# Patient Record
Sex: FEMALE | URBAN - METROPOLITAN AREA
[De-identification: names, ages, dates, MRNs, and addresses within clinical notes are randomized per-mention and may not be internally consistent; named-entity substitution may affect disease eponyms.]

---

## 2018-10-23 ENCOUNTER — APPOINTMENT (RX ONLY)
Dept: URBAN - METROPOLITAN AREA CLINIC 152 | Facility: CLINIC | Age: 53
Setting detail: DERMATOLOGY
End: 2018-10-23

## 2018-10-23 DIAGNOSIS — L71.8 OTHER ROSACEA: ICD-10-CM

## 2018-10-23 DIAGNOSIS — L90.5 SCAR CONDITIONS AND FIBROSIS OF SKIN: ICD-10-CM

## 2018-10-23 DIAGNOSIS — L85.3 XEROSIS CUTIS: ICD-10-CM

## 2018-10-23 PROCEDURE — ? COUNSELING

## 2018-10-23 PROCEDURE — ? INTRALESIONAL KENALOG

## 2018-10-23 PROCEDURE — ? PRESCRIPTION

## 2018-10-23 PROCEDURE — 11900 INJECT SKIN LESIONS </W 7: CPT

## 2018-10-23 PROCEDURE — ? TREATMENT REGIMEN

## 2018-10-23 PROCEDURE — 99213 OFFICE O/P EST LOW 20 MIN: CPT | Mod: 25

## 2018-10-23 RX ORDER — IVERMECTIN 10 MG/G
CREAM TOPICAL QHS
Qty: 1 | Refills: 2 | Status: ERX | COMMUNITY
Start: 2018-10-23

## 2018-10-23 RX ADMIN — IVERMECTIN: 10 CREAM TOPICAL at 00:00

## 2018-10-23 ASSESSMENT — LOCATION DETAILED DESCRIPTION DERM
LOCATION DETAILED: LEFT CHIN
LOCATION DETAILED: UPPER STERNUM
LOCATION DETAILED: MIDDLE STERNUM
LOCATION DETAILED: LEFT MEDIAL MALAR CHEEK
LOCATION DETAILED: SUB-XIPHOID

## 2018-10-23 ASSESSMENT — LOCATION SIMPLE DESCRIPTION DERM
LOCATION SIMPLE: CHEST
LOCATION SIMPLE: CHIN
LOCATION SIMPLE: LEFT CHEEK
LOCATION SIMPLE: ABDOMEN

## 2018-10-23 ASSESSMENT — LOCATION ZONE DERM
LOCATION ZONE: TRUNK
LOCATION ZONE: FACE

## 2018-10-23 NOTE — PROCEDURE: INTRALESIONAL KENALOG
Include Z78.9 (Other Specified Conditions Influencing Health Status) As An Associated Diagnosis?: No
Concentration Of Solution Injected (Mg/Ml): 20.0
Kenalog Preparation: Kenalog
Detail Level: Detailed
Total Volume Injected (Ccs- Only Use Numbers And Decimals): 0.2
Medical Necessity Clause: This procedure was medically necessary because the lesions that were treated were:
Consent: The risks of atrophy were reviewed with the patient.
X Size Of Lesion In Cm (Optional): 0

## 2018-10-23 NOTE — HPI: SKIN LESIONS
How Severe Is Your Skin Lesion?: mild
Have Your Skin Lesions Been Treated?: not been treated
Is This A New Presentation, Or A Follow-Up?: Skin Lesions
Additional History: Pt would like scar on chest looked at\\nPt notes irritation and redness on face she wants addressed

## 2018-10-23 NOTE — PROCEDURE: COUNSELING
Patient Specific Counseling (Will Not Stick From Patient To Patient): -Pt notes improvement, notes two areas that itches a little\\n-Reassured that we can treat the irritated areas with ILK\\n-Let pt know they can treat the discoloring with a laser, pt states she is not bothered by appearance
Detail Level: Detailed
Patient Specific Counseling (Will Not Stick From Patient To Patient): -Discussed most likely diagnosis \\n-Explained different types of rosacea and treatment options\\n-Recommended a topical, Soolantra, since condition is mild enough, for three months\\n-Recommended  Cetaphil as moisturizer to avoid dryness
Patient Specific Counseling (Will Not Stick From Patient To Patient): -Discussed dry skin may be associated with pt’s rosacea \\n-Pt notes irritation from CPAP machine, specifically where it straps on\\n-Recommended a thick moisturizer, given coupons for Cetaphil

## 2018-10-23 NOTE — PROCEDURE: TREATMENT REGIMEN
Action 1: Start
Action 4: Continue
Detail Level: Zone
Treatment 1: Soolantra
Sig For Treatment 1 (If Needed): once daily at bedtime

## 2018-10-30 ENCOUNTER — RX ONLY (OUTPATIENT)
Age: 53
Setting detail: RX ONLY
End: 2018-10-30

## 2018-10-30 RX ORDER — IVERMECTIN 10 MG/G
CREAM TOPICAL
Qty: 1 | Refills: 2 | Status: ERX

## 2019-08-21 ENCOUNTER — APPOINTMENT (RX ONLY)
Dept: URBAN - METROPOLITAN AREA CLINIC 151 | Facility: CLINIC | Age: 54
Setting detail: DERMATOLOGY
End: 2019-08-21

## 2019-08-21 DIAGNOSIS — L30.9 DERMATITIS, UNSPECIFIED: ICD-10-CM

## 2019-08-21 DIAGNOSIS — L71.8 OTHER ROSACEA: ICD-10-CM

## 2019-08-21 DIAGNOSIS — L90.5 SCAR CONDITIONS AND FIBROSIS OF SKIN: ICD-10-CM

## 2019-08-21 PROBLEM — E13.9 OTHER SPECIFIED DIABETES MELLITUS WITHOUT COMPLICATIONS: Status: ACTIVE | Noted: 2019-08-21

## 2019-08-21 PROBLEM — I10 ESSENTIAL (PRIMARY) HYPERTENSION: Status: ACTIVE | Noted: 2019-08-21

## 2019-08-21 PROBLEM — M12.9 ARTHROPATHY, UNSPECIFIED: Status: ACTIVE | Noted: 2019-08-21

## 2019-08-21 PROCEDURE — ? PRESCRIPTION MEDICATION MANAGEMENT

## 2019-08-21 PROCEDURE — 11104 PUNCH BX SKIN SINGLE LESION: CPT

## 2019-08-21 PROCEDURE — ? PRESCRIPTION

## 2019-08-21 PROCEDURE — ? DIAGNOSIS COMMENT

## 2019-08-21 PROCEDURE — ? BIOPSY BY PUNCH METHOD

## 2019-08-21 PROCEDURE — 99213 OFFICE O/P EST LOW 20 MIN: CPT | Mod: 25

## 2019-08-21 RX ORDER — CLOBETASOL PROPIONATE 0.5 MG/G
CREAM TOPICAL BID
Qty: 1 | Refills: 3 | Status: ERX | COMMUNITY
Start: 2019-08-21

## 2019-08-21 RX ADMIN — CLOBETASOL PROPIONATE: 0.5 CREAM TOPICAL at 00:00

## 2019-08-21 ASSESSMENT — LOCATION DETAILED DESCRIPTION DERM: LOCATION DETAILED: LEFT MEDIAL TRAPEZIAL NECK

## 2019-08-21 ASSESSMENT — LOCATION SIMPLE DESCRIPTION DERM: LOCATION SIMPLE: POSTERIOR NECK

## 2019-08-21 ASSESSMENT — LOCATION ZONE DERM: LOCATION ZONE: NECK

## 2019-08-21 NOTE — PROCEDURE: MIPS QUALITY
Quality 226: Preventive Care And Screening: Tobacco Use: Screening And Cessation Intervention: Patient screened for tobacco use and is an ex/non-smoker
Quality 131: Pain Assessment And Follow-Up: Pain assessment using a standardized tool is documented as negative, no follow-up plan required
Detail Level: Detailed
Quality 130: Documentation Of Current Medications In The Medical Record: Current Medications Documented
Quality 110: Preventive Care And Screening: Influenza Immunization: Influenza Immunization Administered during Influenza season
Quality 431: Preventive Care And Screening: Unhealthy Alcohol Use - Screening: Patient screened for unhealthy alcohol use using a single question and scores less than 2 times per year

## 2019-08-21 NOTE — PROCEDURE: DIAGNOSIS COMMENT
Comment: s/p CABG. Patient is very pleased with the ultimate result of the previous treatment.
Detail Level: Simple
Comment: Discussed possible diagnoses. Explained how biopsy would allow us to come to a more definitive dx and would allow for more focused treatment.

## 2019-08-21 NOTE — HPI: RASH
Is This A New Presentation, Or A Follow-Up?: Rash
Additional History: BRIDGETT Marie at [facility], was concerned that it may be autoimmune response.

## 2019-08-21 NOTE — PROCEDURE: BIOPSY BY PUNCH METHOD
Hemostasis: None
Render Path Notes In Note?: No
Was A Bandage Applied: Yes
Lab: -[id number]
Biopsy Type: H and E
Dressing: pressure dressing
Post-Care Instructions: I reviewed with the patient in detail post-care instructions. Patient is to keep the biopsy site dry overnight, and then apply Vaseline or Aquaphor daily until healed. Patient may apply hydrogen peroxide soaks to remove any crusting.
Lab Facility: [id number]
Additional Anesthesia Volume In Cc (Will Not Render If 0): 0
Anesthesia Type: 1% lidocaine with epinephrine
Body Location Override (Optional - Billing Will Still Be Based On Selected Body Map Location If Applicable): left posterior neck
Home Suture Removal Text: Patient was provided a home suture removal kit and will remove their sutures at home.  If they have any questions or difficulties they will call the office.
Path Notes (To The Dermatopathologist): Please call if there is a delay of >5 days rendering dx.
Detail Level: Detailed
Consent: Verbal consent was obtained and risks were reviewed including but not limited to scarring, infection, bleeding, scabbing, incomplete removal, nerve damage and allergy to anesthesia.
Notification Instructions: Patient will be notified of biopsy results. However, patient instructed to call the office if not contacted within 2 weeks.
Punch Size In Mm: 4
Anesthesia Volume In Cc (Will Not Render If 0): 3
Wound Care: Petrolatum
Billing Type: Third-Party Bill
Epidermal Sutures: none, closed by secondary intention

## 2019-09-04 ENCOUNTER — APPOINTMENT (RX ONLY)
Dept: URBAN - METROPOLITAN AREA CLINIC 151 | Facility: CLINIC | Age: 54
Setting detail: DERMATOLOGY
End: 2019-09-04

## 2019-09-04 DIAGNOSIS — L71.8 OTHER ROSACEA: ICD-10-CM

## 2019-09-04 DIAGNOSIS — L90.5 SCAR CONDITIONS AND FIBROSIS OF SKIN: ICD-10-CM

## 2019-09-04 DIAGNOSIS — L93.2 OTHER LOCAL LUPUS ERYTHEMATOSUS: ICD-10-CM

## 2019-09-04 PROCEDURE — ? DIAGNOSIS COMMENT

## 2019-09-04 PROCEDURE — 99213 OFFICE O/P EST LOW 20 MIN: CPT

## 2019-09-04 PROCEDURE — ? PRESCRIPTION MEDICATION MANAGEMENT

## 2019-09-04 ASSESSMENT — LOCATION DETAILED DESCRIPTION DERM
LOCATION DETAILED: SUPERIOR THORACIC SPINE
LOCATION DETAILED: STERNAL NOTCH

## 2019-09-04 ASSESSMENT — LOCATION ZONE DERM: LOCATION ZONE: TRUNK

## 2019-09-04 ASSESSMENT — LOCATION SIMPLE DESCRIPTION DERM
LOCATION SIMPLE: UPPER BACK
LOCATION SIMPLE: CHEST

## 2019-09-04 NOTE — PROCEDURE: PRESCRIPTION MEDICATION MANAGEMENT
Continue Regimen: Soolantra QHS
Detail Level: Zone
Render In Strict Bullet Format?: Yes
Continue Regimen: Clobetasol cream BID as needed (PRN) to the affected areas

## 2019-09-04 NOTE — PROCEDURE: MIPS QUALITY
Quality 130: Documentation Of Current Medications In The Medical Record: Current Medications Documented
Quality 131: Pain Assessment And Follow-Up: Pain assessment using a standardized tool is documented as negative, no follow-up plan required
Quality 110: Preventive Care And Screening: Influenza Immunization: Influenza Immunization Administered during Influenza season
Quality 431: Preventive Care And Screening: Unhealthy Alcohol Use - Screening: Patient screened for unhealthy alcohol use using a single question and scores less than 2 times per year
Detail Level: Detailed
Quality 226: Preventive Care And Screening: Tobacco Use: Screening And Cessation Intervention: Patient screened for tobacco use and is an ex/non-smoker

## 2019-09-04 NOTE — PROCEDURE: DIAGNOSIS COMMENT
Detail Level: Zone
Comment: CHIQUIS dramatically better with topical clobetasol. Given improvement with topical therapy, I do not feel systemic medication is necessary at this time to treat cutaneous findings, if her rheumatologist believes a systemic therapy is necessary, I recommend plaquenil. Stressed importance of sun protection.
Detail Level: Simple
Comment: CABG scar s/p injection with Dr. Galloway doing much better

## 2020-03-04 ENCOUNTER — APPOINTMENT (RX ONLY)
Dept: URBAN - METROPOLITAN AREA CLINIC 151 | Facility: CLINIC | Age: 55
Setting detail: DERMATOLOGY
End: 2020-03-04

## 2020-03-04 DIAGNOSIS — L93.2 OTHER LOCAL LUPUS ERYTHEMATOSUS: ICD-10-CM

## 2020-03-04 PROCEDURE — ? PRESCRIPTION MEDICATION MANAGEMENT

## 2020-03-04 PROCEDURE — ? PRESCRIPTION

## 2020-03-04 PROCEDURE — ? DIAGNOSIS COMMENT

## 2020-03-04 PROCEDURE — 99213 OFFICE O/P EST LOW 20 MIN: CPT

## 2020-03-04 RX ORDER — CLOBETASOL PROPIONATE 0.5 MG/G
CREAM TOPICAL BID
Qty: 1 | Refills: 2 | Status: ERX

## 2020-03-04 ASSESSMENT — LOCATION SIMPLE DESCRIPTION DERM: LOCATION SIMPLE: UPPER BACK

## 2020-03-04 ASSESSMENT — LOCATION ZONE DERM: LOCATION ZONE: TRUNK

## 2020-03-04 ASSESSMENT — LOCATION DETAILED DESCRIPTION DERM: LOCATION DETAILED: SUPERIOR THORACIC SPINE

## 2020-03-04 NOTE — PROCEDURE: DIAGNOSIS COMMENT
Detail Level: Zone
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 Eruption on back could represent Shawls sign. Some involvement on dorsal hands with mild focus over joint spaces. Noted scalp pruritus. ?Gouttron’s papules with involvement over DIP and MCP with mild mid-digital erythema. \\n\\nDue to this constellation of findings, I feel amyopathic dermatomyositis is a valid consideration.Does not have other classic stigmata of DM. No ’s hands. No holster sign.\\n\\nI advised her to continue using sunscreen very consistently, and clobetasol 0.05% cream QD PRN.

## 2020-03-04 NOTE — PROCEDURE: MIPS QUALITY
Quality 226: Preventive Care And Screening: Tobacco Use: Screening And Cessation Intervention: Patient screened for tobacco use and is an ex/non-smoker
Quality 131: Pain Assessment And Follow-Up: Pain assessment using a standardized tool is documented as negative, no follow-up plan required
Quality 431: Preventive Care And Screening: Unhealthy Alcohol Use - Screening: Patient screened for unhealthy alcohol use using a single question and scores less than 2 times per year
Detail Level: Detailed
Quality 110: Preventive Care And Screening: Influenza Immunization: Influenza Immunization Administered during Influenza season
Quality 130: Documentation Of Current Medications In The Medical Record: Current Medications Documented

## 2020-03-04 NOTE — PROCEDURE: PRESCRIPTION MEDICATION MANAGEMENT
Continue Regimen: Clobetasol cream BID as needed (PRN) to the affected areas
Detail Level: Zone
Render In Strict Bullet Format?: Yes

## 2021-09-03 ENCOUNTER — OFFICE (OUTPATIENT)
Dept: URBAN - METROPOLITAN AREA CLINIC 102 | Facility: CLINIC | Age: 56
End: 2021-09-03
Payer: COMMERCIAL

## 2021-09-03 VITALS
HEIGHT: 66 IN | TEMPERATURE: 97.3 F | WEIGHT: 253 LBS | DIASTOLIC BLOOD PRESSURE: 84 MMHG | SYSTOLIC BLOOD PRESSURE: 127 MMHG | HEART RATE: 79 BPM

## 2021-09-03 DIAGNOSIS — I48.0 PAROXYSMAL ATRIAL FIBRILLATION: ICD-10-CM

## 2021-09-03 DIAGNOSIS — K21.9 GASTRO-ESOPHAGEAL REFLUX DISEASE WITHOUT ESOPHAGITIS: ICD-10-CM

## 2021-09-03 DIAGNOSIS — I25.10 ATHEROSCLEROTIC HEART DISEASE OF NATIVE CORONARY ARTERY WITH: ICD-10-CM

## 2021-09-03 DIAGNOSIS — R13.19 OTHER DYSPHAGIA: ICD-10-CM

## 2021-09-03 PROCEDURE — 99244 OFF/OP CNSLTJ NEW/EST MOD 40: CPT

## 2021-09-03 NOTE — SERVICEHPINOTES
CHANTELLE MCDANIELS   is a   55   year old    female who is being seen in consultation at the request of   RAYO CALDERON   for dysphagia. Reports issues ongoing for around a year, has not worsened. Describes oropharyngeal dysphagia although feels like she is able to initiate swallows ok but she cannot describe it much further. Issues with solids/liquids. Issues with no particular foods, can be with anything. No odynophagia.  She reports h/o GERD and hiatal hernia. She believes her last EGD was in 2017 in [place], unsure where/who did it. Had a colonoscopy at this time as well, states she did not have any polyps.BRTakes pantoprazole 40mg daily which works pretty well, has breakthrough sx a couple times per week.BRHad THOMAS back in 2015 requiring iron transfusions and this is why she had EGD/colonoscopy but to her knowledge has not had recurrence since. No abd pain, rectal bleeding, or melena. BRShe had cardiac cath over a year ago. She is on xarelto for a fib. Saw cardiologist 3 weeks ago and was told everything was good.

## 2021-11-01 ENCOUNTER — OFFICE (OUTPATIENT)
Dept: URBAN - METROPOLITAN AREA CLINIC 34 | Facility: CLINIC | Age: 56
End: 2021-11-01
Payer: COMMERCIAL

## 2021-11-01 DIAGNOSIS — Z11.59 ENCOUNTER FOR SCREENING FOR OTHER VIRAL DISEASES: ICD-10-CM

## 2021-11-01 PROCEDURE — 99211 OFF/OP EST MAY X REQ PHY/QHP: CPT | Mod: CS,25 | Performed by: INTERNAL MEDICINE

## 2021-11-01 PROCEDURE — 99211 OFF/OP EST MAY X REQ PHY/QHP: CPT | Mod: 25,CS | Performed by: INTERNAL MEDICINE

## 2021-11-03 ENCOUNTER — ON CAMPUS - OUTPATIENT (OUTPATIENT)
Dept: URBAN - METROPOLITAN AREA HOSPITAL 37 | Facility: HOSPITAL | Age: 56
End: 2021-11-03
Payer: COMMERCIAL

## 2021-11-03 DIAGNOSIS — K31.89 OTHER DISEASES OF STOMACH AND DUODENUM: ICD-10-CM

## 2021-11-03 DIAGNOSIS — K21.9 GASTRO-ESOPHAGEAL REFLUX DISEASE WITHOUT ESOPHAGITIS: ICD-10-CM

## 2021-11-03 DIAGNOSIS — R13.10 DYSPHAGIA, UNSPECIFIED: ICD-10-CM

## 2021-11-03 PROCEDURE — 43239 EGD BIOPSY SINGLE/MULTIPLE: CPT | Performed by: INTERNAL MEDICINE
